# Patient Record
Sex: FEMALE | Employment: OTHER | URBAN - METROPOLITAN AREA
[De-identification: names, ages, dates, MRNs, and addresses within clinical notes are randomized per-mention and may not be internally consistent; named-entity substitution may affect disease eponyms.]

---

## 2017-02-09 ENCOUNTER — MEDICAL CORRESPONDENCE (OUTPATIENT)
Dept: HEALTH INFORMATION MANAGEMENT | Facility: CLINIC | Age: 62
End: 2017-02-09

## 2017-02-14 ENCOUNTER — OFFICE VISIT (OUTPATIENT)
Dept: ORTHOPEDICS | Facility: CLINIC | Age: 62
End: 2017-02-14
Payer: MEDICARE

## 2017-02-14 VITALS
SYSTOLIC BLOOD PRESSURE: 128 MMHG | HEIGHT: 67 IN | BODY MASS INDEX: 31.71 KG/M2 | DIASTOLIC BLOOD PRESSURE: 76 MMHG | WEIGHT: 202 LBS

## 2017-02-14 DIAGNOSIS — M54.5 CHRONIC BILATERAL LOW BACK PAIN, WITH SCIATICA PRESENCE UNSPECIFIED: Primary | ICD-10-CM

## 2017-02-14 DIAGNOSIS — M51.369 LUMBAR DEGENERATIVE DISC DISEASE: ICD-10-CM

## 2017-02-14 DIAGNOSIS — G89.29 CHRONIC BILATERAL LOW BACK PAIN, WITH SCIATICA PRESENCE UNSPECIFIED: Primary | ICD-10-CM

## 2017-02-14 DIAGNOSIS — Z98.890 S/P LUMBAR SPINE OPERATION: ICD-10-CM

## 2017-02-14 PROCEDURE — 99203 OFFICE O/P NEW LOW 30 MIN: CPT | Performed by: PEDIATRICS

## 2017-02-14 RX ORDER — TRAZODONE HYDROCHLORIDE 50 MG/1
50 TABLET, FILM COATED ORAL
COMMUNITY

## 2017-02-14 RX ORDER — BUDESONIDE AND FORMOTEROL FUMARATE DIHYDRATE 160; 4.5 UG/1; UG/1
2 AEROSOL RESPIRATORY (INHALATION)
COMMUNITY
Start: 2016-04-08

## 2017-02-14 RX ORDER — PHENOL 1.4 %
600 AEROSOL, SPRAY (ML) MUCOUS MEMBRANE
COMMUNITY
Start: 2016-09-26

## 2017-02-14 RX ORDER — DULOXETIN HYDROCHLORIDE 30 MG/1
30 CAPSULE, DELAYED RELEASE ORAL
COMMUNITY

## 2017-02-14 RX ORDER — PRAVASTATIN SODIUM 20 MG
20 TABLET ORAL
COMMUNITY
Start: 2016-09-26

## 2017-02-14 RX ORDER — GABAPENTIN 600 MG/1
600 TABLET ORAL
COMMUNITY
Start: 2017-01-30

## 2017-02-14 RX ORDER — TIAGABINE HYDROCHLORIDE 4 MG/1
4 TABLET, FILM COATED ORAL
COMMUNITY
Start: 2016-09-26

## 2017-02-14 RX ORDER — OXYBUTYNIN CHLORIDE 5 MG/1
5 TABLET, EXTENDED RELEASE ORAL
COMMUNITY
Start: 2016-09-27

## 2017-02-14 RX ORDER — OXYCODONE AND ACETAMINOPHEN 10; 325 MG/1; MG/1
1 TABLET ORAL
COMMUNITY
Start: 2017-01-30

## 2017-02-14 NOTE — PROGRESS NOTES
Sports Medicine Clinic Visit    PCP: No primary care provider on file.    Lindsay Triana is a 61 year old female who is seen  in consultation at the request of [facility] and [facility] presenting with low back pain.  Patient had a lower thoracic fusion in September 2016 at [facility].  She would like to continue treatment with them, but with a change of insurance this January, they have requested she have a medical spine evaluation.    Patient has had ongoing back issues for about 5 years.  Has undergone SHAKIR, possible discectomy.  Patient states she has a bad memory and cannot remember all of the details.  Unsure when her last MRI was done. Chart review shows [facility] 3/5/2016  She was unable to schedule an appointment until she was seen here.   Does have pain that travels down her back and both legs to her toes.  Increasing over the past 4 months.    Does have some issues with bladder control.     Injury: ongoing  **  Lumbar decompression 2015. That did not help. Has had lumbar spine injection as well, no benefit.  **  Has an upcoming CT ordered by [facility]. She notes that she was advised that potential candidacy for a lumbar procedure hinges on results from the imaging of her thoracic spine.  Still in brace from recent thoracic spine surgery.    Location of Pain: bilateral low back.   Duration of Pain: 5 year(s)  Rating of Pain at worst: 10/10  Rating of Pain Currently: 7/10  Symptoms are better with: Other medications: Percocet   Symptoms are worse with: standing and walking  Additional Features:   Positive: paresthesias, numbness and weakness   Negative: swelling, bruising, popping, grinding, catching, locking, instability, pain in other joints and systemic symptoms  Other evaluation and/or treatments so far consists of: SHAKIR, surgery  Prior History of related problems: ongoing    Social History: disability     Lindsay was asked to complete the Oswestry Low Back Disability Indextoday in  "the office.  Disability score: 64%.     Review of Systems  Musculoskeletal: as above  Remainder of review of systems is negative including constitutional, CV, pulmonary, GI, Skin and Neurologic except as noted in HPI or medical history.    Past Medical History   Diagnosis Date     Depressive disorder      Environmental allergies      Hyperlipidemia       **  Smoker    Past Surgical History   Procedure Laterality Date     Orthopedic surgery       Appendectomy       Cholecystectomy       Gyn surgery       Family History   Problem Relation Age of Onset     Hyperlipidemia Mother      DIABETES Mother      Hypertension Mother      Depression Mother      Hyperlipidemia Father      Hypertension Father      Depression Father      Colon Cancer Maternal Grandfather      Prostate Cancer Maternal Grandfather      Substance Abuse Maternal Grandfather      Social History     Social History     Marital status:      Spouse name: N/A     Number of children: N/A     Years of education: N/A     Occupational History     Not on file.     Social History Main Topics     Smoking status: Current Every Day Smoker     Smokeless tobacco: Not on file     Alcohol use Not on file     Drug use: Not on file     Sexual activity: Not on file     Other Topics Concern     Not on file     Social History Narrative     No narrative on file       Objective  /76  Ht 5' 7\" (1.702 m)  Wt 202 lb (91.6 kg)  BMI 31.64 kg/m2    GENERAL APPEARANCE: alert and no distress   GAIT: Ambulates independently, some pain with position changes  SKIN: no suspicious lesions or rashes  NEURO: Normal tone, mentation intact and speech normal  PSYCH:  mentation appears normal and affect normal/bright  HEENT: no scleral icterus  CV: regional pulses intact  RESP: nonlabored breathing    Low back exam:    Inspection:       normal skin       normal vascular       normal lymphatic       Brace in place    ROM:  Limited with brace    Sensation:       Subjectively decreased " in the lower extremities    Skin:       well perfused       capillary refill brisk    Special tests:        slump test limited with brace      Radiology:  Visualized MRI of the thoracic spine from 4/16, and reviewed images and report with patient.  MRI demonstrates findings as noted below.          Clinical History: Thoracic pain.     Technique: Multiplanar multisequence thoracic spine MRI without   contrast.      Comparison:  7/9/2014.     Findings: Unchanged previous T8 vertebroplasty.     Unchanged previous T8-9 right hemilaminectomy.     No abnormalities of the spinal cord or significant compromise of the   central spinal canal.     Impression: No acute abnormality or interval change.    Signed by: Lakisha Menchaca Signed on: Apr- 20:25     Visualized MRI of the lumbar spine 3/16, and reviewed images and report with patient.  MRI demonstrates L4-L5 facet arthrosis, evidence of L4-L5 laminotomy.      Clinical History: Back pain syndrome and spinal stenosis.     Technique: Multiplanar multisequence lumbar spine MRI without   contrast.      Comparison:  Lumbar spine MRI 8/6/2014.     Findings: There are changes from a small laminotomy on the left at   L4-5. Alignment, vertebral body heights and disc heights are normal.   There are small osteophytes and mild degenerative endplate changes   from L2-3 to L4-5. Conus medullaris is normally positioned.   Paraspinous soft tissues are unremarkable.     T12-L1: Unremarkable.     L1-L2:  Unremarkable.     L2-3: Shallow disc bulge and marginal osteophytes. Mild facet   hypertrophy. Spinal canal is patent. The neural foramina are mildly   narrowed bilaterally.     L3-L4: Shallow disc bulge and marginal osteophytes. Spinal canal is   patent. Neural foramina are moderately narrowed bilaterally.     L4-L5: Small laminotomy on the left side. Generalized disc bulge and   marginal osteophytes. Spinal canal is patent. The neural foramina are   moderately narrowed bilaterally.  "There is moderate facet hypertrophy   bilaterally.     L5-S1: Minimal annular bulge. Spinal canal and neural foramina are   patent. Facets are unremarkable.     Impression:  1. Multilevel degenerative disc disease.  2. Bulging disc causes moderate neural foraminal narrowing   bilaterally at L3-4 and L4-5. This may be effecting the L3 or L4   nerve roots.  3. No central spinal stenosis.    Signed by: Luan Amor Signed on: Mar- 13:14       Assessment:  1. Chronic bilateral low back pain, with sciatica presence unspecified    2. S/P lumbar spine operation    3. Lumbar degenerative disc disease        Plan:  Discussed the assessment with the patient.  Discussed my understanding of [employer] insurance company policy regarding patients with low back pain being seen in a \"medical spine center.\"  Reviewed her course. Has done PT, injection therapy, failed prior lumbar surgery. Still undergoing tx for her thoracic spine issues.  Recommend she continue care with [facility]. Otherwise, would suggest pain mgmt.  Follow up: is as needed.  Questions answered. The patient indicates understanding of these issues and agrees with the plan.    Rush Mccabe DO, C[facility]A    CC: [facility]          Disclaimer: This note consists of symbols derived from keyboarding, dictation and/or voice recognition software. As a result, there may be errors in the script that have gone undetected. Please consider this when interpreting information found in this chart.      "

## 2017-02-14 NOTE — NURSING NOTE
"Chief Complaint   Patient presents with     Musculoskeletal Problem     low back pain       Initial /76  Ht 5' 7\" (1.702 m)  Wt 202 lb (91.6 kg)  BMI 31.64 kg/m2 Estimated body mass index is 31.64 kg/(m^2) as calculated from the following:    Height as of this encounter: 5' 7\" (1.702 m).    Weight as of this encounter: 202 lb (91.6 kg).  Medication Reconciliation: complete  "

## 2023-06-23 ENCOUNTER — TRANSCRIBE ORDERS (OUTPATIENT)
Dept: OTHER | Age: 68
End: 2023-06-23

## 2023-06-23 DIAGNOSIS — S42.202A CLOSED FRACTURE OF PROXIMAL END OF LEFT HUMERUS, UNSPECIFIED FRACTURE MORPHOLOGY, INITIAL ENCOUNTER: Primary | ICD-10-CM

## 2023-07-11 ENCOUNTER — THERAPY VISIT (OUTPATIENT)
Dept: PHYSICAL THERAPY | Facility: CLINIC | Age: 68
End: 2023-07-11
Payer: COMMERCIAL

## 2023-07-11 DIAGNOSIS — S42.202A CLOSED FRACTURE OF PROXIMAL END OF LEFT HUMERUS, UNSPECIFIED FRACTURE MORPHOLOGY, INITIAL ENCOUNTER: ICD-10-CM

## 2023-07-11 PROCEDURE — 97161 PT EVAL LOW COMPLEX 20 MIN: CPT | Mod: GP | Performed by: PHYSICAL THERAPIST

## 2023-07-11 PROCEDURE — 97110 THERAPEUTIC EXERCISES: CPT | Mod: GP | Performed by: PHYSICAL THERAPIST

## 2023-07-11 NOTE — PROGRESS NOTES
PHYSICAL THERAPY EVALUATION  Type of Visit: Evaluation    See electronic medical record for Abuse and Falls Screening details.    Subjective      Presenting condition or subjective complaint: L humeral fracture  Date of onset: 06/23/23    Relevant medical history: -- (arthritis, COPD, depression, fibromyalgia, implanted device, mental illness, migraines, osteoporosis, overweight, pain at night or rest, severe headaches, significant weakness, smoking)   Prior diagnostic imaging/testing results: X-ray     Prior therapy history for the same diagnosis, illness or injury: No      Living Environment  Social support: With a significant other or spouse   Help at home: Home management tasks (cooking, cleaning); Assist for driving and community activities; Self Cares (home health aide/personal care attendant, family, etc)    Patient goals for therapy: move arm, be able to rest without increased pain     Objective   SHOULDER EVALUATION  PAIN: Pt indicates having chronic pain of her back, but shoulder pain is very high. Pt reports was not given medications in ER by the ER MD due to having a history of use of opioids. Pt reports will not see primary until the end of July and is noticing pain in stomach due to taking many tylenol and ibuprofen constantly.  POSTURE: Sitting Posture: Rounded shoulders, Forward head  GAIT:   Pt ambulates with L elbow flexed, arm at side. No sling as was told to remove the sling  ROM: pt heavily guarded during PROM. empty end feel for all PROM: 25* ER, 90* flexion.      Assessment & Plan   CLINICAL IMPRESSIONS   Medical Diagnosis: L Humeral Fracture    Treatment Diagnosis: L Humeral Fracture   Impression/Assessment: Patient is a 67 year old female with L shoulder pain complaints.  The following significant findings have been identified: Pain, Decreased ROM/flexibility, Impaired muscle performance and Decreased activity tolerance. These impairments interfere with their ability to perform self care  tasks, recreational activities, household chores, household mobility and community mobility as compared to previous level of function.     Clinical Decision Making (Complexity):   Clinical Presentation: Stable/Uncomplicated  Clinical Presentation Rationale: based on medical and personal factors listed in PT evaluation  Clinical Decision Making (Complexity): Low complexity    PLAN OF CARE  Treatment Interventions:  Interventions: Manual Therapy, Neuromuscular Re-education, Therapeutic Activity, Therapeutic Exercise    Long Term Goals     PT Goal 1  Goal Identifier: Reaching  Goal Description: pt will be able to demonstrate full PROM and AROM in order to perform self cares of the LUE without increased pain  Rationale: to maximize safety and independence with performance of ADLs and functional tasks  Target Date: 10/03/23      Frequency of Treatment: 1x per week  Duration of Treatment: 12 weeks    Recommended Referrals to Other Professionals: pt recommended that she follow up with primary MD in order to ask questions about medication as PT indicated concern over increased stomach pain after taking tylenol and ibuprofen regularly. Pt declined care at our facility for this.  Education Assessment:        Risks and benefits of evaluation/treatment have been explained.   Patient/Family/caregiver agrees with Plan of Care.     Evaluation Time:     PT Eval, Low Complexity Minutes (67938): 15      Signing Clinician: Мария Turcios, PT      [facility]                                                                                   OUTPATIENT PHYSICAL THERAPY      PLAN OF TREATMENT FOR OUTPATIENT REHABILITATION   Patient's Last Name, First Name, Lindsay Humphrey YOB: 1955   Provider's Name   [facility]   Medical Record No.  [id number]     Onset Date: 06/23/23  Start of Care Date: 07/11/23     Medical Diagnosis:  L Humeral Fracture      PT  Treatment Diagnosis:  L Humeral Fracture Plan of Treatment  Frequency/Duration: 1x per week/ 12 weeks    Certification date from 07/11/23 to 10/03/23         See note for plan of treatment details and functional goals     Мария Turcios, PT                         I CERTIFY THE NEED FOR THESE SERVICES FURNISHED UNDER        THIS PLAN OF TREATMENT AND WHILE UNDER MY CARE .             Physician Signature               Date    X_____________________________________________________                        Referring Provider:  Gonzalo Sprague      Initial Assessment  See Epic Evaluation- Start of Care Date: 07/11/23

## 2023-07-26 ENCOUNTER — THERAPY VISIT (OUTPATIENT)
Dept: PHYSICAL THERAPY | Facility: CLINIC | Age: 68
End: 2023-07-26
Attending: ORTHOPAEDIC SURGERY
Payer: COMMERCIAL

## 2023-07-26 DIAGNOSIS — M25.512 SHOULDER PAIN, LEFT: Primary | ICD-10-CM

## 2023-07-26 PROCEDURE — 97110 THERAPEUTIC EXERCISES: CPT | Mod: GP | Performed by: PHYSICAL THERAPIST

## 2023-07-26 PROCEDURE — 97032 APPL MODALITY 1+ESTIM EA 15: CPT | Mod: GP | Performed by: PHYSICAL THERAPIST

## 2023-08-09 ENCOUNTER — THERAPY VISIT (OUTPATIENT)
Dept: PHYSICAL THERAPY | Facility: CLINIC | Age: 68
End: 2023-08-09
Attending: ORTHOPAEDIC SURGERY
Payer: COMMERCIAL

## 2023-08-09 DIAGNOSIS — M25.512 SHOULDER PAIN, LEFT: Primary | ICD-10-CM

## 2023-08-09 PROCEDURE — 97110 THERAPEUTIC EXERCISES: CPT | Mod: GP | Performed by: PHYSICAL THERAPIST

## 2023-08-09 PROCEDURE — 97016 VASOPNEUMATIC DEVICE THERAPY: CPT | Mod: GP | Performed by: PHYSICAL THERAPIST

## 2023-08-09 NOTE — PROGRESS NOTES
08/09/23 0500   Appointment Info   Signing clinician's name / credentials Мария Turcios, PT, DPT   Total/Authorized Visits 12   Visits Used 3   Medical Diagnosis L Humeral Fracture   PT Tx Diagnosis L Humeral Fracture   Quick Adds Certification   Progress Note/Certification   Start of Care Date 07/11/23   Onset of illness/injury or Date of Surgery 06/23/23   Therapy Frequency 1x per week   Predicted Duration 12 weeks   Certification date from 07/11/23   Certification date to 10/03/23   Progress Note Due Date 10/03/23   PT Goal 1   Goal Identifier Reaching   Goal Description pt will be able to demonstrate full PROM and AROM in order to perform self cares of the LUE without increased pain   Rationale to maximize safety and independence with performance of ADLs and functional tasks   Goal Progress recent flare up of pain when accidentally reaching by her back while being half asleep in her recliner.   Target Date 10/03/23   Subjective Report   Subjective Report pt reports about a week ago, twisted arm behind back while sitting in recliner accidentally. pt reports before Thursday it was doing really well. pt reports could previously lift 1#.   Objective Measures   Objective Measures Objective Measure 1   Objective Measure 1   Objective Measure Shoulder PROM:   Details unable to perform PROM of the shoulder completely due to high pain, palpable inflammation in the L bicep proximal tendon. significant empty end feel with shoulder ER and IR >50%.   PT Modalities   PT Modalities Vasopneumatic device   Vasopneumatic Device   Vasopneumatic Pressure low   Vasopneumatic device minutes (29062) 10   Patient Response/Progress pt reports much less pain while using it.   Duration 10 min   Temperature 45*   Treatment Interventions (PT)   Interventions Therapeutic Procedure/Exercise;Manual Therapy   Therapeutic Procedure/Exercise   Therapeutic Procedures: strength, endurance, ROM, flexibillity minutes (68891) 22   Ther Proc 1  PROM elbow lexion, extension, supination, pronation and shoulder ER and IR with significant loss of motion secondary to increased pain. pt education to continue same HEP. pt education on palpable swelling and pain in the L proximal bicep. pt attempted passive elbow extension while supine with increased pain after 20 minutes. pt education on use of cold pack at home to reduce shoulder pain.   PTRx Ther Proc 1 Passive Shoulder Flexion Sitting   PTRx Ther Proc 1 - Details per HEP 10 repetitions in a row. 2x daily. x10 with less pain from last week and improved ROM.   PTRx Ther Proc 2 Passive Shoulder External Rotation   PTRx Ther Proc 2 - Details per HEP 10 repetitions in a row. 2x daily.x10 x1 set with more ROM and less pain   Skilled Intervention PROM, education   Patient Response/Progress pt reports ice helps with the pain.   Plan   Home program see ptrx   Plan for next session TENS, consider other pain relieving modalities   Total Session Time   Timed Code Treatment Minutes 22   Total Treatment Time (sum of timed and untimed services) 32         PLAN  Continue therapy per current plan of care, pt has tolerated PROM of the shoulder only secondary to 2 recent flares up pain.    Beginning/End Dates of Progress Note Reporting Period:   7/11/2023 to 08/09/2023. Pt has attended 3 visits.    Referring Provider:  Gonzalo Sprague

## 2023-08-14 ENCOUNTER — THERAPY VISIT (OUTPATIENT)
Dept: PHYSICAL THERAPY | Facility: CLINIC | Age: 68
End: 2023-08-14
Payer: COMMERCIAL

## 2023-08-14 DIAGNOSIS — M25.512 SHOULDER PAIN, LEFT: Primary | ICD-10-CM

## 2023-08-14 PROCEDURE — 97110 THERAPEUTIC EXERCISES: CPT | Mod: GP | Performed by: PHYSICAL THERAPIST

## 2023-08-21 ENCOUNTER — THERAPY VISIT (OUTPATIENT)
Dept: PHYSICAL THERAPY | Facility: CLINIC | Age: 68
End: 2023-08-21
Payer: COMMERCIAL

## 2023-08-21 DIAGNOSIS — M25.512 SHOULDER PAIN, LEFT: Primary | ICD-10-CM

## 2023-08-21 PROCEDURE — 97110 THERAPEUTIC EXERCISES: CPT | Mod: GP | Performed by: PHYSICAL THERAPIST

## 2023-08-23 ENCOUNTER — THERAPY VISIT (OUTPATIENT)
Dept: PHYSICAL THERAPY | Facility: CLINIC | Age: 68
End: 2023-08-23
Payer: COMMERCIAL

## 2023-08-23 DIAGNOSIS — M25.512 SHOULDER PAIN, LEFT: Primary | ICD-10-CM

## 2023-08-23 PROCEDURE — 97110 THERAPEUTIC EXERCISES: CPT | Mod: GP | Performed by: PHYSICAL THERAPIST

## 2023-08-31 ENCOUNTER — THERAPY VISIT (OUTPATIENT)
Dept: PHYSICAL THERAPY | Facility: CLINIC | Age: 68
End: 2023-08-31
Payer: COMMERCIAL

## 2023-08-31 DIAGNOSIS — M25.512 ACUTE PAIN OF LEFT SHOULDER: Primary | ICD-10-CM

## 2023-08-31 PROCEDURE — 97110 THERAPEUTIC EXERCISES: CPT | Mod: GP

## 2023-09-06 ENCOUNTER — THERAPY VISIT (OUTPATIENT)
Dept: PHYSICAL THERAPY | Facility: CLINIC | Age: 68
End: 2023-09-06
Payer: COMMERCIAL

## 2023-09-06 DIAGNOSIS — M25.512 SHOULDER PAIN, LEFT: Primary | ICD-10-CM

## 2023-09-06 PROCEDURE — 97110 THERAPEUTIC EXERCISES: CPT | Mod: GP | Performed by: PHYSICAL THERAPIST

## 2023-09-08 ENCOUNTER — THERAPY VISIT (OUTPATIENT)
Dept: PHYSICAL THERAPY | Facility: CLINIC | Age: 68
End: 2023-09-08
Payer: COMMERCIAL

## 2023-09-08 DIAGNOSIS — M25.512 SHOULDER PAIN, LEFT: Primary | ICD-10-CM

## 2023-09-08 PROCEDURE — 97110 THERAPEUTIC EXERCISES: CPT | Mod: GP | Performed by: PHYSICAL THERAPIST

## 2023-09-12 ENCOUNTER — THERAPY VISIT (OUTPATIENT)
Dept: PHYSICAL THERAPY | Facility: CLINIC | Age: 68
End: 2023-09-12
Payer: COMMERCIAL

## 2023-09-12 DIAGNOSIS — M25.512 SHOULDER PAIN, LEFT: Primary | ICD-10-CM

## 2023-09-12 PROCEDURE — 97110 THERAPEUTIC EXERCISES: CPT | Mod: GP | Performed by: PHYSICAL THERAPIST

## 2023-09-14 ENCOUNTER — THERAPY VISIT (OUTPATIENT)
Dept: PHYSICAL THERAPY | Facility: CLINIC | Age: 68
End: 2023-09-14
Payer: COMMERCIAL

## 2023-09-14 DIAGNOSIS — M25.512 SHOULDER PAIN, LEFT: Primary | ICD-10-CM

## 2023-09-14 PROCEDURE — 97110 THERAPEUTIC EXERCISES: CPT | Mod: GP | Performed by: PHYSICAL THERAPIST

## 2023-09-14 NOTE — PROGRESS NOTES
09/14/23 0500   Appointment Info   Signing clinician's name / credentials Js Betts PT   Total/Authorized Visits 12   Visits Used 11   Medical Diagnosis L Humeral Fracture   PT Tx Diagnosis L Humeral Fracture, impaired ROM, pain   Quick Adds Certification   Progress Note/Certification   Start of Care Date 07/11/23   Onset of illness/injury or Date of Surgery 06/23/23   Therapy Frequency 1x per week   Predicted Duration 12 weeks   Certification date from 07/11/23   Certification date to 10/03/23   Progress Note Due Date 09/19/23   PT Goal 1   Goal Identifier Reaching   Goal Description pt will be able to demonstrate full PROM and AROM in order to perform self cares of the LUE without increased pain   Rationale to maximize safety and independence with performance of ADLs and functional tasks   Goal Progress ROM progressing   Target Date 10/03/23   Subjective Report   Subjective Report Was more sore after last visit.  MD next week   Objective Measures   Objective Measures Objective Measure 2   Objective Measure 1   Objective Measure AROM   Details flex 128  abd 107   Objective Measure 2   Objective Measure PROM   Details flex 157  abd 121  IR 56  ER 30   Treatment Interventions (PT)   Interventions Therapeutic Procedure/Exercise   Therapeutic Procedure/Exercise   Therapeutic Procedures: strength, endurance, ROM, flexibillity minutes (18448) 30   Ther Proc 1 PROM supine   Ther Proc 1 - Details all motions x 15 min with light stretch at end ranges   Ther Proc 2 ladder climb   Ther Proc 2 - Details x 10 level 27   PTRx Ther Proc 3 supine shoulder flex   PTRx Ther Proc 3 - Details x 10 - incrased pain at end range   PTRx Ther Proc 4 Wand Shoulder Abduction Standing   PTRx Ther Proc 4 - Details x 10   PTRx Ther Proc 5 Wand Shoulder Flexion in Standing   PTRx Ther Proc 5 - Details x 10   PTRx Ther Proc 6 Pulley Shoulder Flexion   PTRx Ther Proc 6 - Details 5 min sitting   Skilled Intervention PROM done by PT in clinic    Patient Response/Progress Pain continues to limit ROM   Education   Learner/Method Patient   Plan   Home program continue with ROM exercises at home as tolerated   Plan for next session continue pending new orders for MD   Total Session Time   Timed Code Treatment Minutes 30   Total Treatment Time (sum of timed and untimed services) 30         PLAN  Continue therapy per current plan of care.  ROM is improving at a slow pace limited by pain.      Beginning/End Dates of Progress Note Reporting Period:    to 09/14/2023    Referring Provider:  Gonzalo Sprague

## 2023-09-20 ENCOUNTER — THERAPY VISIT (OUTPATIENT)
Dept: PHYSICAL THERAPY | Facility: CLINIC | Age: 68
End: 2023-09-20
Payer: COMMERCIAL

## 2023-09-20 DIAGNOSIS — M25.512 LEFT SHOULDER PAIN, UNSPECIFIED CHRONICITY: Primary | ICD-10-CM

## 2023-09-20 PROCEDURE — 97110 THERAPEUTIC EXERCISES: CPT | Mod: GP

## 2023-09-22 ENCOUNTER — TRANSCRIBE ORDERS (OUTPATIENT)
Dept: OTHER | Age: 68
End: 2023-09-22

## 2023-09-22 DIAGNOSIS — S42.202S CLOSED FRACTURE OF PROXIMAL END OF LEFT HUMERUS, UNSPECIFIED FRACTURE MORPHOLOGY, SEQUELA: Primary | ICD-10-CM

## 2023-10-06 ENCOUNTER — THERAPY VISIT (OUTPATIENT)
Dept: PHYSICAL THERAPY | Facility: CLINIC | Age: 68
End: 2023-10-06
Payer: COMMERCIAL

## 2023-10-06 DIAGNOSIS — M25.512 SHOULDER PAIN, LEFT: Primary | ICD-10-CM

## 2023-10-06 PROCEDURE — 97010 HOT OR COLD PACKS THERAPY: CPT | Mod: GP

## 2023-10-06 PROCEDURE — 97140 MANUAL THERAPY 1/> REGIONS: CPT | Mod: GP

## 2023-10-06 PROCEDURE — 97110 THERAPEUTIC EXERCISES: CPT | Mod: GP

## 2023-10-06 NOTE — PROGRESS NOTES
10/06/23 0500   Appointment Info   Signing clinician's name / credentials Мария Turcios, PT, DPT, Elida Vega, SPT   Total/Authorized Visits 12   Visits Used 13   Medical Diagnosis L Humeral Fracture   PT Tx Diagnosis L Humeral Fracture, impaired ROM, pain   Other pertinent information Developed migraine detention through session. Presented with significant fatigue and weakness.   Quick Adds Certification   Progress Note/Certification   Start of Care Date 07/11/23   Onset of illness/injury or Date of Surgery 06/23/23   Therapy Frequency 1x per week   Predicted Duration 8 weeks   Certification date from 10/03/23   Certification date to 12/01/23   Progress Note Due Date 12/01/23   Progress Note Completed Date 10/06/23   PT Goal 1   Goal Identifier Reaching   Goal Description pt will be able to demonstrate full PROM and AROM in order to perform self cares of the LUE without increased pain   Rationale to maximize safety and independence with performance of ADLs and functional tasks   Goal Progress PROM has regressed since last visit d/t fatigue, weakness, and not performing exercises at home.   Target Date 11/03/23  (pushed out 1 month d/t PROM regression from previous visit)   Subjective Report   Subjective Report Increasing fatigue, horrible migraines, reports not doing exercises at home because of weakness and lack of energy. Reorts cardiologist said heart is not causing the upper left chest pain and that it is painful from the lungs.   Objective Measures   Objective Measures Objective Measure 2;Objective Measure 3   Objective Measure 1   Objective Measure Palpation   Details Tenderness to distal deltoid, pecs in the axillary region, and sharp pain L sided ribs/intercostal space.   Objective Measure 2   Objective Measure PROM   Details 125* abduction, 145* flexion, 50* ER   Objective Measure 3   Objective Measure Vitals   Details 75bpm, 92% with deep breathing cues, has history of COPD. 72bpm and 89% in supine  after 10min.   PT Modalities   PT Modalities Cryotherapy   Cryotherapy   Ice -Type Pack   Cryotherapy Minutes (45208) 8   Patient Response/Progress Pain relief, discontinued after 8min d/t feeling overall cold.   Treatment Detail Ice pack application to L shoulder, focusing on superior and anterior shoulder and anterior upper ribs.   Duration 8 min   Location L shoulder   Positioning supine   Treatment Interventions (PT)   Interventions Manual Therapy;Therapeutic Procedure/Exercise   Therapeutic Procedure/Exercise   Therapeutic Procedures: strength, endurance, ROM, flexibillity minutes (16449) 20   Ther Proc 1 Education on doing exercises when feeling up for it d/t fatigue, weakness, etc.   Ther Proc 2 DB Supine Shoulder Flexion   Ther Proc 2 - Details 1lb DB, attempted 3x2 with pain during eccentric lowering towards table with arm straight   Ther Proc 3 Sidelying Shoulder ER   Ther Proc 3 - Details 10 reps 1lb DB w/ straight arm lifting to 90*   PTRx Ther Proc 1 Shoulder Theraband Rows   PTRx Ther Proc 1 - Details red band in the door handle. 10 repetitions 1x daily. Did not attempt today d/t regression in PROM and increased fatigue/weakness.    PTRx Ther Proc 2 Pulley Shoulder Flexion   PTRx Ther Proc 2 - Details Per HEP 5 minutes daily. Today 2min.    PTRx Ther Proc 3 Pulley Shoulder Abduction   PTRx Ther Proc 3 - Details Per HEP 5 minutes daily.    PTRx Ther Proc 4 Shoulder Sidelying Abduction   PTRx Ther Proc 4 - Details Per HEP 2x 5-10 reps holding tuna can (.5lb) or pop can (1lb). Did not attempt today d/t regression in PROM and increased fatigue/weakness.    PTRx Ther Proc 5 Supine Active Shoulder Flexion   PTRx Ther Proc 5 - Details Did not attempt today d/t regression in PROM and increased fatigue/weakness.    PTRx Ther Proc 6 Supine Ceiling Punch   PTRx Ther Proc 6 - Details Per HEP 2x 5-10 reps holding tuna can (.5lb) or pop can (1lb). Did not attempt today d/t regression in PROM and increased  fatigue/weakness.    PTRx Ther Proc 7 Shoulder AROM IR/ER Supine at 30-40 Degrees Abduction   PTRx Ther Proc 7 - Details No Notes   Skilled Intervention ROM   Patient Response/Progress Pain at end ranges of PROM   Therapeutic Procedures Ther Proc 2;Ther Proc 4   Ther Proc 4 PROM   Ther Proc 4 - Details L shoulder flexion, abduction, and ER x8min total   Neuromuscular Re-education   PTRx Neuro Re-ed 1 Proprioception At Counter Weight Shift   PTRx Neuro Re-ed 1 - Details on the fists. place a towel on the counter top when performing. 1 minute 1-2x daily.    Manual Therapy   Manual Therapy: Mobilization, MFR, MLD, friction massage minutes (20864) 10   Manual Therapy 1 STM   Manual Therapy 1 - Details STM to L side distal deltoid, pec in axillary region, and general relaxation of all muscles around shoulder joint.   Skilled Intervention Pain relief   Patient Response/Progress Felt less tight after STM   Education   Learner/Method Patient   Plan   Home program see ptrx   Plan for next session Advance AROM if tolerated, PROM if needed. Arm bike.   Total Session Time   Timed Code Treatment Minutes 30   Total Treatment Time (sum of timed and untimed services) 38       [facility]                                                                                   OUTPATIENT PHYSICAL THERAPY    PLAN OF TREATMENT FOR OUTPATIENT REHABILITATION   Patient's Last Name, First Name, TANNERKI TrianaLindsay  S YOB: 1955   Provider's Name   [facility]   Medical Record No.  [id number]     Onset Date: 06/23/23  Start of Care Date: 07/11/23     Medical Diagnosis:  L Humeral Fracture      PT Treatment Diagnosis:  L Humeral Fracture, impaired ROM, pain Plan of Treatment  Frequency/Duration: 1x per week/ 8 weeks    Certification date from 10/03/23 to 12/01/23         See note for plan of treatment details and functional goals     Мария Turcios, PT                          I CERTIFY THE NEED FOR THESE SERVICES FURNISHED UNDER        THIS PLAN OF TREATMENT AND WHILE UNDER MY CARE .             Physician Signature               Date    X_____________________________________________________                    Referring Provider:  Gonzalo Sprague      Initial Assessment  See Epic Evaluation- Start of Care Date: 07/11/23            PLAN  Continue therapy per current plan of care.    Beginning/End Dates of Progress Note Reporting Period:  9/19/2023 to 10/06/2023    Referring Provider:  Gonzalo Sprague

## 2023-10-13 ENCOUNTER — THERAPY VISIT (OUTPATIENT)
Dept: PHYSICAL THERAPY | Facility: CLINIC | Age: 68
End: 2023-10-13
Payer: COMMERCIAL

## 2023-10-13 DIAGNOSIS — G89.29 CHRONIC LEFT SHOULDER PAIN: Primary | ICD-10-CM

## 2023-10-13 DIAGNOSIS — M25.512 CHRONIC LEFT SHOULDER PAIN: Primary | ICD-10-CM

## 2023-10-13 PROCEDURE — 97140 MANUAL THERAPY 1/> REGIONS: CPT | Mod: GP

## 2023-10-13 PROCEDURE — 97110 THERAPEUTIC EXERCISES: CPT | Mod: GP

## 2023-10-18 ENCOUNTER — THERAPY VISIT (OUTPATIENT)
Dept: PHYSICAL THERAPY | Facility: CLINIC | Age: 68
End: 2023-10-18
Payer: COMMERCIAL

## 2023-10-18 DIAGNOSIS — M25.512 CHRONIC LEFT SHOULDER PAIN: Primary | ICD-10-CM

## 2023-10-18 DIAGNOSIS — G89.29 CHRONIC LEFT SHOULDER PAIN: Primary | ICD-10-CM

## 2023-10-18 PROCEDURE — 97110 THERAPEUTIC EXERCISES: CPT | Mod: GP

## 2023-10-25 ENCOUNTER — THERAPY VISIT (OUTPATIENT)
Dept: PHYSICAL THERAPY | Facility: CLINIC | Age: 68
End: 2023-10-25
Payer: COMMERCIAL

## 2023-10-25 DIAGNOSIS — M25.512 CHRONIC LEFT SHOULDER PAIN: Primary | ICD-10-CM

## 2023-10-25 DIAGNOSIS — G89.29 CHRONIC LEFT SHOULDER PAIN: Primary | ICD-10-CM

## 2023-10-25 PROCEDURE — 97110 THERAPEUTIC EXERCISES: CPT | Mod: GP

## 2023-10-25 NOTE — PROGRESS NOTES
10/25/23 0500   Appointment Info   Signing clinician's name / credentials Мария Turcios, PT, DPT, Elida Vega, SPT   Total/Authorized Visits 12   Visits Used 16   Medical Diagnosis L Humeral Fracture   PT Tx Diagnosis L Humeral Fracture, impaired ROM, pain   Quick Adds Certification   Progress Note/Certification   Start of Care Date 07/11/23   Onset of illness/injury or Date of Surgery 06/23/23   Therapy Frequency 1x per week   Predicted Duration 8 weeks   Certification date from 10/03/23   Certification date to 12/01/23   Progress Note Due Date 12/01/23   PT Goal 1   Goal Identifier Reaching   Goal Description pt will be able to demonstrate full PROM and AROM in order to perform self cares of the LUE without increased pain   Rationale to maximize safety and independence with performance of ADLs and functional tasks   Goal Progress can reach for glass above head, tightness feeling in top of shoulder, motion is WFL   Target Date 11/03/23  (pushed out 1 month d/t PROM regression from previous visit)   Date Met 10/25/23   Subjective Report   Subjective Report Saw  yesterday for COPD and chest pain, given steroid and antibiotic meds. Quit smoking yesterday. Drinking Body Armor because electrolytes were low. Feels she has come a long ways since starting PT   Objective Measures   Objective Measures Objective Measure 2   Objective Measure 1   Objective Measure ROM   Details AROM: L shoulder: 140*, abduction 145*. PROM: 150* flexion, 150* abduction, firm end feels   Treatment Interventions (PT)   Interventions Therapeutic Procedure/Exercise   Therapeutic Procedure/Exercise   Therapeutic Procedures: strength, endurance, ROM, flexibillity minutes (73244) 28   Therapeutic Procedures Ther Proc 2;Ther Proc 3   Ther Proc 1 Education to continue to do exercises as able at home and to keep up with the ROM and strengthening   Ther Proc 2 PROM   Ther Proc 2 - Details 3 min shoulder abduction, ER, and flexion   Ther Proc 3  Shoulder pulley's   Ther Proc 3 - Details 4min total, flexion and abduction   PTRx Ther Proc 1 Latissimus Dorsi Stretch   PTRx Ther Proc 1 - Details verbal review   PTRx Ther Proc 2 Shoulder Theraband Rows   PTRx Ther Proc 2 - Details today x10   PTRx Ther Proc 3 Shoulder Theraband Extension   PTRx Ther Proc 3 - Details today x10   PTRx Ther Proc 4 Pulley Shoulder Flexion   PTRx Ther Proc 4 - Details verbal review for at home   PTRx Ther Proc 5 Pulley Shoulder Abduction   PTRx Ther Proc 5 - Details verbal review for at home   PTRx Ther Proc 6 Supine Ceiling Punch   PTRx Ther Proc 6 - Details 15 reps total worked up to 5# DB   PTRx Ther Proc 7 Shoulder Abduction   PTRx Ther Proc 7 - Details 20 reps total worked up to 3#   PTRx Ther Proc 8 Alternating Front Shoulder Raise   PTRx Ther Proc 8 - Details 15 reps total worked up to 4#    Skilled Intervention Strengthening, ROM   Patient Response/Progress tight feeling in shoulder, no increase in pain. shoulder elevation compensation when weight too heavy   Education   Learner/Method Patient   Plan   Home program see ptrx   Comments   Comments Progressed weights in shoulder flexion, abduction, and ceiling punches, pt compensated with shoulder elevation when weight too heavy but did not increase pain.   Total Session Time   Timed Code Treatment Minutes 28   Total Treatment Time (sum of timed and untimed services) 28         DISCHARGE  Reason for Discharge: Patient has met all goals.    Equipment Issued: none    Discharge Plan: Patient to continue home program.    Referring Provider:  Gonzalo Sprague